# Patient Record
Sex: MALE | Race: WHITE | Employment: FULL TIME | URBAN - METROPOLITAN AREA
[De-identification: names, ages, dates, MRNs, and addresses within clinical notes are randomized per-mention and may not be internally consistent; named-entity substitution may affect disease eponyms.]

---

## 2020-12-04 PROCEDURE — 87804 INFLUENZA ASSAY W/OPTIC: CPT

## 2020-12-04 PROCEDURE — U0002 COVID-19 LAB TEST NON-CDC: HCPCS

## 2020-12-04 PROCEDURE — 99284 EMERGENCY DEPT VISIT MOD MDM: CPT

## 2020-12-05 ENCOUNTER — HOSPITAL ENCOUNTER (EMERGENCY)
Age: 25
Discharge: HOME | End: 2020-12-05

## 2020-12-05 ENCOUNTER — APPOINTMENT (OUTPATIENT)
Dept: GENERAL RADIOLOGY | Age: 25
End: 2020-12-05

## 2020-12-05 VITALS
TEMPERATURE: 98.3 F | SYSTOLIC BLOOD PRESSURE: 125 MMHG | HEART RATE: 84 BPM | HEIGHT: 73 IN | OXYGEN SATURATION: 98 % | RESPIRATION RATE: 16 BRPM | BODY MASS INDEX: 29.82 KG/M2 | WEIGHT: 225 LBS | DIASTOLIC BLOOD PRESSURE: 84 MMHG

## 2020-12-05 LAB
CHP ED QC CHECK: YES
GLUCOSE BLD-MCNC: 88 MG/DL
GLUCOSE BLD-MCNC: 88 MG/DL (ref 70–99)
RAPID INFLUENZA  B AGN: NEGATIVE
RAPID INFLUENZA A AGN: NEGATIVE

## 2020-12-05 PROCEDURE — 82962 GLUCOSE BLOOD TEST: CPT

## 2020-12-05 PROCEDURE — 71045 X-RAY EXAM CHEST 1 VIEW: CPT

## 2020-12-05 NOTE — ED PROVIDER NOTES
[facility] [address] EMERGENCY DEPARTMENT    Patient: Holger Goss  MRN: [id number]  : 1995  Date of Evaluation: 2020  ED Provider: Shae Chang PA-C    Chief Complaint       Chief Complaint   Patient presents with    Fatigue       Mayelin Shafer is a 26 y.o. male who presents to the emergency department for generalized body aches, fatigue. Onset was today. Constant. Denies fever. Denies cough or congestion. Denies n/v/d. Reports all food and drink tastes \"different. \"  No known sick contacts. ROS     CONSTITUTIONAL:  Denies fever.  + body aches, fatigue. EYES:  Denies visual changes. HEAD:  Denies headache. ENT:  Denies earache, nasal congestion, sore throat.  + change in taste. NECK:  Denies neck pain. RESPIRATORY:  Denies any shortness of breath. CARDIOVASCULAR:  Denies chest pain. GI:  Denies nausea or vomiting. :  Denies urinary symptoms. MUSCULOSKELETAL:  Denies extremity pain or swelling. BACK:  Denies back pain. INTEGUMENT:  Denies skin changes. LYMPHATIC:  Denies lymphadenopathy. NEUROLOGIC:  Denies any numbness/tingling. Past History     Past Medical History:   Diagnosis Date    Anxiety     [facility]     Depression      History reviewed. No pertinent surgical history.   Social History     Socioeconomic History    Marital status: Single     Spouse name: None    Number of children: None    Years of education: None    Highest education level: None   Occupational History    None   Social Needs    Financial resource strain: None    Food insecurity     Worry: None     Inability: None    Transportation needs     Medical: None     Non-medical: None   Tobacco Use    Smoking status: Current Every Day Smoker     Packs/day: 3.00     Types: Cigarettes    Smokeless tobacco: Current User   Substance and Sexual Activity    Alcohol use: None    Drug use: None    Sexual activity: None   Lifestyle    Physical activity     Days per week: None     Minutes per session: None    Stress: None   Relationships    Social connections     Talks on phone: None     Gets together: None     Attends L[employer] day service: None     Active member of club or organization: None     Attends meetings of clubs or organizations: None     Relationship status: None    Intimate partner violence     Fear of current or ex partner: None     Emotionally abused: None     Physically abused: None     Forced sexual activity: None   Other Topics Concern    None   Social History Narrative    None       Medications/Allergies     Previous Medications    No medications on file     Allergies   Allergen Reactions    Lithium Nausea And Vomiting        Physical Exam       ED Triage Vitals [12/04/20 2116]   BP Temp Temp Source Pulse Resp SpO2 Height Weight   125/84 98.3 °F (36.8 °C) Oral 89 18 97 % 6' 1\" (1.854 m) 225 lb (102.1 kg)     GENERAL APPEARANCE:  Well-developed, well-nourished, no acute distress. HEAD:  NC/AT. EYES:  Sclera anicteric. ENT:  Ears, nose, mouth normal.     NECK:  Supple. LUNGS:    Respirations unlabored. ABDOMEN:  Soft, non-distended, non-tender. BS active. EXTREMITIES:  No acute deformities. SKIN:  Warm and dry. NEUROLOGICAL:  Alert and oriented. PSYCHIATRIC:  Normal mood. Diagnostics     Labs:  Results for orders placed or performed during the hospital encounter of 12/05/20   Rapid Flu Swab    Specimen: Nasopharyngeal   Result Value Ref Range    Rapid Influenza A Ag NEGATIVE NEGATIVE    Rapid Influenza B Ag NEGATIVE NEGATIVE   POC Blood Glucose   Result Value Ref Range    Glucose 88 mg/dL    QC OK? yes    POCT Glucose   Result Value Ref Range    POC Glucose 88 70 - 99 MG/DL       Radiographs:  Xr Chest Portable    Result Date: 12/5/2020  EXAMINATION: ONE XRAY VIEW OF THE CHEST 12/5/2020 1:24 am COMPARISON: None.  HISTORY: ORDERING SYSTEM PROVIDED HISTORY: body aches, fatigue TECHNOLOGIST

## 2020-12-05 NOTE — ED TRIAGE NOTES
Pt presents to the ED c/o fatigue. States it has been going on all day. Denies being around anyone sick. Pt is alert and oriented, denies pain.

## 2020-12-05 NOTE — ED NOTES
Pt seen by Dr. Jonathan Calhoun for virtual pit at this time      Bhupinder Melendez, RN  12/04/20 4791

## 2020-12-07 ENCOUNTER — CARE COORDINATION (OUTPATIENT)
Dept: CARE COORDINATION | Age: 25
End: 2020-12-07

## 2020-12-07 LAB
SARS-COV-2: NOT DETECTED
SOURCE: NORMAL

## 2020-12-07 NOTE — CARE COORDINATION
Call to check on pt status after recent ER visit for generalized body aches, fatigue. Patient contacted regarding recent discharge and COVID-19 risk. Discussed COVID-19 related testing which was pending at this time. Test results were pending. Patient informed of results, if available? Pt advised test results are still pending. Care Transition Nurse/ Ambulatory Care Manager contacted the patient by telephone to perform post discharge assessment. Verified name and  with patient as identifiers. Patient has following risk factors of: current smoker. CTN/ACM reviewed discharge instructions, medical action plan and red flags related to discharge diagnosis. Reviewed and educated them on any new and changed medications related to discharge diagnosis. Advised obtaining a 90-day supply of all daily and as-needed medications. Education provided regarding infection prevention, and signs and symptoms of COVID-19 and when to seek medical attention with patient who verbalized understanding. Discussed exposure protocols and quarantine from [address] you at higher risk for severe illness  and given an opportunity for questions and concerns. The patient agrees to contact the COVID-19 hotline [phone] or PCP office for questions related to their healthcare. CTN/ACM provided contact information for future reference. From CDC: Are you at higher risk for severe illness?  Wash your hands often.  Avoid close contact (6 feet, which is about two arm lengths) with people who are sick.  Put distance between yourself and other people if COVID-19 is spreading in your community.  Clean and disinfect frequently touched surfaces.  Avoid all cruise travel and non-essential air travel.  Call your healthcare professional if you have concerns about COVID-19 and your underlying condition or if you are sick.     For more information on steps you can take to protect yourself, see CDC's How to Protect Yourself    Plan for follow-up call in 7-14 days based on severity of symptoms and risk factors. Pt reports improvement in symptoms after receiving fluids. He is tolerating fluids at home. Denies any new symptoms or concerns. Advised pt that he should remain self quarantined pending a negative COVID test given his symptoms. KATIE Sheffield RN  Ambulatory Care Manager  [phone] office/cell  [phone] fax  [email or web address]

## 2020-12-18 ENCOUNTER — CARE COORDINATION (OUTPATIENT)
Dept: CARE COORDINATION | Age: 25
End: 2020-12-18

## 2020-12-18 NOTE — CARE COORDINATION
You Patient resolved from the Care Transitions episode on 12/18/20. Discussed COVID-19 related testing which was available at this time. Test results were negative. Patient informed of results, if available? Yes    Patient/family has been provided the following resources and education related to COVID-19:                         Signs, symptoms and red flags related to COVID-19            CDC exposure and quarantine guidelines            [facility] exposure contact - [phone]            Contact for their local Department of Health                 Patient currently reports that the following symptoms have improved:  fatigue and pain or aching joints     No further outreach scheduled with this CTN/ACM. Episode of Care resolved. Patient has this CTN/ACM contact information if future needs arise. Pt reports he is doing well, no residual symptoms or concerns. Pt denies additional needs at present. KATIE Starks RN  Ambulatory Care Manager  [phone] office/cell  [phone] fax  [email or web address]